# Patient Record
Sex: FEMALE | Race: WHITE | HISPANIC OR LATINO
[De-identification: names, ages, dates, MRNs, and addresses within clinical notes are randomized per-mention and may not be internally consistent; named-entity substitution may affect disease eponyms.]

---

## 2018-12-01 PROBLEM — Z00.00 ENCOUNTER FOR PREVENTIVE HEALTH EXAMINATION: Status: ACTIVE | Noted: 2018-12-01

## 2018-12-12 ENCOUNTER — RECORD ABSTRACTING (OUTPATIENT)
Age: 53
End: 2018-12-12

## 2018-12-12 DIAGNOSIS — Z87.42 PERSONAL HISTORY OF OTHER DISEASES OF THE FEMALE GENITAL TRACT: ICD-10-CM

## 2018-12-12 DIAGNOSIS — F32.9 MAJOR DEPRESSIVE DISORDER, SINGLE EPISODE, UNSPECIFIED: ICD-10-CM

## 2018-12-12 DIAGNOSIS — Z83.3 FAMILY HISTORY OF DIABETES MELLITUS: ICD-10-CM

## 2018-12-12 DIAGNOSIS — Z78.9 OTHER SPECIFIED HEALTH STATUS: ICD-10-CM

## 2018-12-12 DIAGNOSIS — E66.9 OBESITY, UNSPECIFIED: ICD-10-CM

## 2018-12-12 DIAGNOSIS — G35 MULTIPLE SCLEROSIS: ICD-10-CM

## 2018-12-12 DIAGNOSIS — B37.2 CANDIDIASIS OF SKIN AND NAIL: ICD-10-CM

## 2018-12-12 DIAGNOSIS — F41.9 ANXIETY DISORDER, UNSPECIFIED: ICD-10-CM

## 2018-12-12 DIAGNOSIS — Z82.49 FAMILY HISTORY OF ISCHEMIC HEART DISEASE AND OTHER DISEASES OF THE CIRCULATORY SYSTEM: ICD-10-CM

## 2018-12-12 LAB — CYTOLOGY CVX/VAG DOC THIN PREP: NORMAL

## 2018-12-24 PROBLEM — B37.2 CANDIDAL INTERTRIGO: Status: ACTIVE | Noted: 2018-12-12

## 2018-12-31 ENCOUNTER — APPOINTMENT (OUTPATIENT)
Dept: OBGYN | Facility: CLINIC | Age: 53
End: 2018-12-31

## 2019-01-31 ENCOUNTER — APPOINTMENT (OUTPATIENT)
Dept: OBGYN | Facility: CLINIC | Age: 54
End: 2019-01-31
Payer: COMMERCIAL

## 2019-01-31 VITALS
HEIGHT: 69 IN | SYSTOLIC BLOOD PRESSURE: 120 MMHG | DIASTOLIC BLOOD PRESSURE: 74 MMHG | WEIGHT: 217 LBS | BODY MASS INDEX: 32.14 KG/M2

## 2019-01-31 DIAGNOSIS — Z87.898 PERSONAL HISTORY OF OTHER SPECIFIED CONDITIONS: ICD-10-CM

## 2019-01-31 DIAGNOSIS — L29.2 PRURITUS VULVAE: ICD-10-CM

## 2019-01-31 DIAGNOSIS — Z86.19 PERSONAL HISTORY OF OTHER INFECTIOUS AND PARASITIC DISEASES: ICD-10-CM

## 2019-01-31 LAB
BILIRUB UR QL STRIP: NORMAL
COLLECTION METHOD: NORMAL
DATE COLLECTED: NORMAL
GLUCOSE UR-MCNC: NORMAL
HCG UR QL: 0.2 EU/DL
HCG UR QL: NEGATIVE
HEMOCCULT SP1 STL QL: NEGATIVE
HGB UR QL STRIP.AUTO: NORMAL
KETONES UR-MCNC: NORMAL
LEUKOCYTE ESTERASE UR QL STRIP: NORMAL
NITRITE UR QL STRIP: NORMAL
PH UR STRIP: 6
PROT UR STRIP-MCNC: NORMAL
QUALITY CONTROL: YES
QUALITY CONTROL: YES
SP GR UR STRIP: 1.02

## 2019-01-31 PROCEDURE — 81025 URINE PREGNANCY TEST: CPT

## 2019-01-31 PROCEDURE — 82270 OCCULT BLOOD FECES: CPT

## 2019-01-31 PROCEDURE — 81003 URINALYSIS AUTO W/O SCOPE: CPT | Mod: QW

## 2019-01-31 PROCEDURE — 99396 PREV VISIT EST AGE 40-64: CPT

## 2019-01-31 NOTE — PHYSICAL EXAM
[Awake] : awake [Alert] : alert [Soft] : soft [Oriented x3] : oriented to person, place, and time [Vulvar Atrophy] : vulvar atrophy [Vulvitis] : vulvitis [Labia Majora] : labia major [Labia Minora] : labia minora [Normal] : clitoris [Atrophy] : atrophy [No Bleeding] : there was no active vaginal bleeding [Uterine Adnexae] : were not tender and not enlarged [No Tenderness] : no rectal tenderness [Nl Sphincter Tone] : normal sphincter tone [Acute Distress] : no acute distress [Mass] : no breast mass [Nipple Discharge] : no nipple discharge [Axillary LAD] : no axillary lymphadenopathy [Tender] : non tender [Discharge] : no discharge [Tenderness] : nontender [Enlarged ___ wks] : not enlarged [Mass ___ cm] : no uterine mass was palpated [Occult Blood] : occult blood test from digital rectal exam was negative

## 2019-01-31 NOTE — REVIEW OF SYSTEMS
[Sight Problems] : sight problems [Excessive Sweating] : excessive sweating [Nl] : Hematologic/Lymphatic [FreeTextEntry2] : and excessive thirst

## 2019-01-31 NOTE — HISTORY OF PRESENT ILLNESS
[Last Bone Density ___] : Last bone density studies [unfilled] [Last Pap Smear ___] : last Papanicolaou cytology done [unfilled] [Last HPV Screen ___] : last human papilloma virus screening done [unfilled] [Last Mammogram ___] : last mammogram done [unfilled] [Hot Flashes] : hot flashes [Definite:  ___ (Date)] : the last menstrual period was [unfilled] [Menarche Age: ____] : age at menarche was [unfilled] [___ Month(s) Ago] : [unfilled] month(s) ago [Good] : being in good health [Colonoscopy Within 10 Years] : a colonoscopy within the past ten years [Burning] : burning [Itching] : itching [Stinging] : stinging [Rt Vulva] : right vulva [Lt Vulva] : left vulva [Menarche ____ yo] : menarche at [unfilled] yo [Healthy Diet] : not having a healthy diet [Regular Exercise] : not exercising regularly [FreeTextEntry8] : vaginal dryness [Sexually Active] : is not sexually active

## 2019-02-01 LAB — HPV HIGH+LOW RISK DNA PNL CVX: NOT DETECTED

## 2019-02-05 LAB — CYTOLOGY CVX/VAG DOC THIN PREP: NORMAL

## 2019-02-19 ENCOUNTER — ASOB RESULT (OUTPATIENT)
Age: 54
End: 2019-02-19

## 2019-02-19 ENCOUNTER — APPOINTMENT (OUTPATIENT)
Dept: OBGYN | Facility: CLINIC | Age: 54
End: 2019-02-19
Payer: COMMERCIAL

## 2019-02-19 PROCEDURE — 76830 TRANSVAGINAL US NON-OB: CPT

## 2019-05-22 ENCOUNTER — ASOB RESULT (OUTPATIENT)
Age: 54
End: 2019-05-22

## 2019-05-22 ENCOUNTER — APPOINTMENT (OUTPATIENT)
Dept: OBGYN | Facility: CLINIC | Age: 54
End: 2019-05-22
Payer: COMMERCIAL

## 2019-05-22 VITALS
DIASTOLIC BLOOD PRESSURE: 80 MMHG | BODY MASS INDEX: 31.1 KG/M2 | HEIGHT: 69 IN | WEIGHT: 210 LBS | SYSTOLIC BLOOD PRESSURE: 120 MMHG

## 2019-05-22 DIAGNOSIS — N83.209 UNSPECIFIED OVARIAN CYST, UNSPECIFIED SIDE: ICD-10-CM

## 2019-05-22 LAB
BILIRUB UR QL STRIP: NORMAL
GLUCOSE UR-MCNC: NORMAL
HCG UR QL: 0.2 EU/DL
HGB UR QL STRIP.AUTO: ABNORMAL
KETONES UR-MCNC: ABNORMAL
LEUKOCYTE ESTERASE UR QL STRIP: NORMAL
NITRITE UR QL STRIP: NORMAL
PH UR STRIP: 6
PROT UR STRIP-MCNC: NORMAL
SP GR UR STRIP: 1.02

## 2019-05-22 PROCEDURE — 76830 TRANSVAGINAL US NON-OB: CPT

## 2019-05-22 PROCEDURE — 81003 URINALYSIS AUTO W/O SCOPE: CPT | Mod: QW

## 2019-05-22 PROCEDURE — 99213 OFFICE O/P EST LOW 20 MIN: CPT | Mod: 25

## 2019-05-22 NOTE — HISTORY OF PRESENT ILLNESS
[___ Month(s) Ago] : [unfilled] month(s) ago [Good] : being in good health [Healthy Diet] : a healthy diet [Regular Exercise] : regular exercise [Last Pap Smear ___] : last Papanicolaou cytology done [unfilled] [Annual Vaginal Sonography ___] : annual vaginal sonograph [unfilled] [Last Mammogram ___] : last mammogram done [unfilled] [Postmenopausal] : is postmenopausal [HPV Vaccine NA Due to Age] : HPV vaccine not available to patient due to age [Menarche Age: ____] : age at menarche was [unfilled] [Weight Concerns] : no concerns with her weight [de-identified] : BU: 2/28/2019 BR1  [Sexually Active] : is not sexually active

## 2019-05-22 NOTE — PHYSICAL EXAM
[Awake] : awake [Alert] : alert [Oriented x3] : oriented to person, place, and time [Acute Distress] : no acute distress

## 2019-05-22 NOTE — DISCUSSION/SUMMARY
[FreeTextEntry1] : 54 year old  0, whose last menstrual period was 2018, is here for consultation\par \par Ms. CHE has a history of a right oophorectomy and has been followed for a stable left ovarian cyst.  She has had intermittent pain and today is asymptomatic.  Today the ultrasound shows a 2.73 x 1.91 x 1.97 cm left ovary with resolution of the cyst.  There is no free fluid.  The mechanism of cyst formation and as well as the definition, expectations of menopause were explained.\par \par All of her concerns were addressed, questions answered and reassurance was given.

## 2020-07-21 ENCOUNTER — APPOINTMENT (OUTPATIENT)
Dept: OBGYN | Facility: CLINIC | Age: 55
End: 2020-07-21
Payer: COMMERCIAL

## 2020-07-21 VITALS
TEMPERATURE: 98 F | SYSTOLIC BLOOD PRESSURE: 138 MMHG | HEIGHT: 68 IN | WEIGHT: 215 LBS | BODY MASS INDEX: 32.58 KG/M2 | DIASTOLIC BLOOD PRESSURE: 82 MMHG

## 2020-07-21 DIAGNOSIS — Z12.11 ENCOUNTER FOR SCREENING FOR MALIGNANT NEOPLASM OF COLON: ICD-10-CM

## 2020-07-21 DIAGNOSIS — R14.0 ABDOMINAL DISTENSION (GASEOUS): ICD-10-CM

## 2020-07-21 DIAGNOSIS — Z01.419 ENCOUNTER FOR GYNECOLOGICAL EXAMINATION (GENERAL) (ROUTINE) W/OUT ABNORMAL FINDINGS: ICD-10-CM

## 2020-07-21 DIAGNOSIS — R92.2 INCONCLUSIVE MAMMOGRAM: ICD-10-CM

## 2020-07-21 DIAGNOSIS — Z12.39 ENCOUNTER FOR OTHER SCREENING FOR MALIGNANT NEOPLASM OF BREAST: ICD-10-CM

## 2020-07-21 DIAGNOSIS — Z72.3 LACK OF PHYSICAL EXERCISE: ICD-10-CM

## 2020-07-21 DIAGNOSIS — R68.81 EARLY SATIETY: ICD-10-CM

## 2020-07-21 DIAGNOSIS — Z12.4 ENCOUNTER FOR SCREENING FOR MALIGNANT NEOPLASM OF CERVIX: ICD-10-CM

## 2020-07-21 DIAGNOSIS — Z83.42 FAMILY HISTORY OF FAMILIAL HYPERCHOLESTEROLEMIA: ICD-10-CM

## 2020-07-21 LAB
DATE COLLECTED: NORMAL
HEMOCCULT SP1 STL QL: NEGATIVE
QUALITY CONTROL: YES

## 2020-07-21 PROCEDURE — 82270 OCCULT BLOOD FECES: CPT

## 2020-07-21 PROCEDURE — 99396 PREV VISIT EST AGE 40-64: CPT

## 2020-07-21 RX ORDER — CLOBETASOL PROPIONATE 0.5 MG/G
0.05 CREAM TOPICAL TWICE DAILY
Qty: 20 | Refills: 5 | Status: DISCONTINUED | COMMUNITY
Start: 2019-01-31 | End: 2020-07-21

## 2020-07-21 NOTE — COUNSELING
[Exercise] : exercise [Nutrition] : nutrition [Breast Self Exam] : breast self exam [Weight Management] : weight management [Vitamins/Supplements] : vitamins/supplements

## 2020-07-22 LAB — HPV HIGH+LOW RISK DNA PNL CVX: NOT DETECTED

## 2020-08-17 NOTE — HISTORY OF PRESENT ILLNESS
[___ Month(s) Ago] : [unfilled] month(s) ago [Good] : being in good health [Last Bone Density ___] : Last bone density studies [unfilled] [Last Pap Smear ___] : last Papanicolaou cytology done [unfilled] [Last HPV Screen ___] : last human papilloma virus screening done [unfilled] [Colonoscopy Within 10 Years] : a colonoscopy within the past ten years [Last Mammogram ___] : last mammogram done [unfilled] [Burning] : burning [Itching] : itching [Stinging] : stinging [Rt Vulva] : right vulva [Lt Vulva] : left vulva [Menarche ____ yo] : menarche at [unfilled] yo [Hot Flashes] : hot flashes [Definite:  ___ (Date)] : the last menstrual period was [unfilled] [Menarche Age: ____] : age at menarche was [unfilled] [Healthy Diet] : not having a healthy diet [Regular Exercise] : not exercising regularly [FreeTextEntry8] : vaginal dryness [Sexually Active] : is not sexually active

## 2020-08-17 NOTE — PHYSICAL EXAM
[Awake] : awake [Alert] : alert [Soft] : soft [Vulvar Atrophy] : vulvar atrophy [Oriented x3] : oriented to person, place, and time [Vulvitis] : vulvitis [Labia Majora] : labia major [Labia Minora] : labia minora [Normal] : clitoris [Atrophy] : atrophy [No Bleeding] : there was no active vaginal bleeding [No Tenderness] : no rectal tenderness [Uterine Adnexae] : were not tender and not enlarged [Nl Sphincter Tone] : normal sphincter tone [Acute Distress] : no acute distress [Mass] : no breast mass [Nipple Discharge] : no nipple discharge [Axillary LAD] : no axillary lymphadenopathy [Tender] : non tender [Discharge] : no discharge [Tenderness] : nontender [Enlarged ___ wks] : not enlarged [Mass ___ cm] : no uterine mass was palpated [Occult Blood] : occult blood test from digital rectal exam was negative

## 2020-08-17 NOTE — DISCUSSION/SUMMARY
[FreeTextEntry1] : 55 year old postmenopausal  0 para 2, whose last menstrual period was 2018, is here for an annual exam.  Her Pap smear from 2019 was negative and no oncogenic human papilloma virus was detected. \par \par Ms. CHE had breast imaging on 2019 which showed a stable parenchymal pattern.  There were "no spiculated masses or areas of architectural distortion...suspicious clustered microcalcifications."  The ultrasound shows "no sonographic evidence of malignancy in either breast."  \par \par On inspection of the breasts, there were no skin color changes, dimpling or retraction seen.  No masses or axillary lymphadenopathy were appreciated on palpation.  She was advised of the current recommendations to reduce breast cancer including but not limited to engaging in regular exercise, consuming a low fat diet, limiting alcohol intake, maintaining ideal body weight, continuing with yearly imaging and performing monthly self breast examination.  A referral for mammogram with ultrasound was provided.    \par \par The patient has a history of vulvar lichenification with cuts in the labial fold on the left side which has completely resolved.\par \par BEATRICE had a right oophorectomy and was being followed for a stable left ovarian cyst and intermittent pain.  Her pelvic ultrasound from  demonstrated resolution of the left ovarian cyst.  She has early satiety and abdominal bloating but denies nausea, vomiting or changes in bowel, bladder habits.  The patient was sent for a pelvic sonogram.  \par \par The importance of weight bearing exercise and adequate daily calcium with vitamin D3 to slow the progression of bone loss was underscored. \par \par Weight loss via health diet and exercise was suggested. \par \par All of her concerns were addressed, questions answered and reassurance was given.

## 2020-12-16 PROBLEM — Z87.42 HISTORY OF VAGINITIS: Status: RESOLVED | Noted: 2018-12-12 | Resolved: 2020-12-16

## 2020-12-23 PROBLEM — Z01.419 ENCOUNTER FOR ANNUAL ROUTINE GYNECOLOGICAL EXAMINATION: Status: RESOLVED | Noted: 2019-01-31 | Resolved: 2020-12-23

## 2021-10-12 ENCOUNTER — APPOINTMENT (OUTPATIENT)
Dept: OBGYN | Facility: CLINIC | Age: 56
End: 2021-10-12

## 2021-11-05 ENCOUNTER — APPOINTMENT (OUTPATIENT)
Dept: ORTHOPEDIC SURGERY | Facility: CLINIC | Age: 56
End: 2021-11-05

## 2023-08-11 DIAGNOSIS — Z78.9 OTHER SPECIFIED HEALTH STATUS: ICD-10-CM

## 2023-08-11 DIAGNOSIS — I77.9 DISORDER OF ARTERIES AND ARTERIOLES, UNSPECIFIED: ICD-10-CM

## 2023-08-11 DIAGNOSIS — I65.29 OCCLUSION AND STENOSIS OF UNSPECIFIED CAROTID ARTERY: ICD-10-CM

## 2023-08-11 DIAGNOSIS — Z82.49 FAMILY HISTORY OF ISCHEMIC HEART DISEASE AND OTHER DISEASES OF THE CIRCULATORY SYSTEM: ICD-10-CM

## 2023-08-11 DIAGNOSIS — R94.31 ABNORMAL ELECTROCARDIOGRAM [ECG] [EKG]: ICD-10-CM

## 2023-08-11 DIAGNOSIS — Z92.89 PERSONAL HISTORY OF OTHER MEDICAL TREATMENT: ICD-10-CM

## 2023-08-14 ENCOUNTER — APPOINTMENT (OUTPATIENT)
Dept: ORTHOPEDIC SURGERY | Facility: CLINIC | Age: 58
End: 2023-08-14
Payer: MEDICARE

## 2023-08-14 VITALS — WEIGHT: 215 LBS | HEIGHT: 68 IN | BODY MASS INDEX: 32.58 KG/M2

## 2023-08-14 DIAGNOSIS — M65.30 TRIGGER FINGER, UNSPECIFIED FINGER: ICD-10-CM

## 2023-08-14 DIAGNOSIS — M79.646 PAIN IN UNSPECIFIED FINGER(S): ICD-10-CM

## 2023-08-14 PROCEDURE — 73140 X-RAY EXAM OF FINGER(S): CPT | Mod: RT

## 2023-08-14 PROCEDURE — 99203 OFFICE O/P NEW LOW 30 MIN: CPT

## 2023-08-14 PROCEDURE — 99213 OFFICE O/P EST LOW 20 MIN: CPT

## 2023-08-14 NOTE — IMAGING
[de-identified] : Right hand with no swelling or erythema. Able to make fist, oppose thumb to small finger with good thenar bulk. No intrinsic atrophy. Tender along distal palmar crease, most notable at thumb. No overt locking with catching palpable. Sensation intact throughout with <2sec cap refill. MP hyperextension  Right thumb radiographs with no fracture or dislocation. Thumb CMC arthrosis.

## 2023-08-14 NOTE — ASSESSMENT
[FreeTextEntry1] : Right thumb trigger - Discussed with patient the pathoanatomy of trigger and options going forward. Risks/benefits discussed as well as natural progression that injection will provide some relief but symptoms may recur. Discussed that pain may worsen in coming days but will subside. Discussed that we can repeat an injection or that operative intervention may be indicated down the line.  F/u prn (for CSI)

## 2023-08-14 NOTE — HISTORY OF PRESENT ILLNESS
[de-identified] : Age: 58F PMHx: CAD, Anxiety, MS Hand Dominance: RHD Chief Complaint: Right trigger thumb onset 2020. Patient reports clicking, sticking and locking. Reports pain when the thumb gets locked. Denies numbness/tingling.  Trauma: NKI Outside Imaging/Treatment: X-rays 3 years ago OTC Medications: none OT/PT: none  Bracing: none  Pain worse with: when it clicks Pain better with: rest

## 2023-11-08 RX ORDER — EZETIMIBE 10 MG/1
10 TABLET ORAL
Qty: 90 | Refills: 3 | Status: ACTIVE | COMMUNITY
Start: 1900-01-01 | End: 1900-01-01

## 2023-12-19 ENCOUNTER — NON-APPOINTMENT (OUTPATIENT)
Age: 58
End: 2023-12-19

## 2023-12-19 ENCOUNTER — APPOINTMENT (OUTPATIENT)
Dept: CARDIOLOGY | Facility: CLINIC | Age: 58
End: 2023-12-19
Payer: MEDICARE

## 2023-12-19 VITALS
DIASTOLIC BLOOD PRESSURE: 62 MMHG | WEIGHT: 220 LBS | HEIGHT: 68 IN | OXYGEN SATURATION: 99 % | HEART RATE: 96 BPM | BODY MASS INDEX: 33.34 KG/M2 | SYSTOLIC BLOOD PRESSURE: 90 MMHG

## 2023-12-19 DIAGNOSIS — R94.31 ABNORMAL ELECTROCARDIOGRAM [ECG] [EKG]: ICD-10-CM

## 2023-12-19 DIAGNOSIS — I95.9 HYPOTENSION, UNSPECIFIED: ICD-10-CM

## 2023-12-19 DIAGNOSIS — I25.10 ATHEROSCLEROTIC HEART DISEASE OF NATIVE CORONARY ARTERY W/OUT ANGINA PECTORIS: ICD-10-CM

## 2023-12-19 PROCEDURE — 99204 OFFICE O/P NEW MOD 45 MIN: CPT

## 2023-12-19 PROCEDURE — 99214 OFFICE O/P EST MOD 30 MIN: CPT

## 2023-12-19 PROCEDURE — 93000 ELECTROCARDIOGRAM COMPLETE: CPT

## 2023-12-19 RX ORDER — METOPROLOL TARTRATE 50 MG/1
50 TABLET, FILM COATED ORAL
Refills: 0 | Status: DISCONTINUED | COMMUNITY
End: 2023-12-19

## 2023-12-19 RX ORDER — DEXTROAMPHETAMINE SACCHARATE, AMPHETAMINE ASPARTATE, DEXTROAMPHETAMINE SULFATE, AND AMPHETAMINE SULFATE 7.5; 7.5; 7.5; 7.5 MG/1; MG/1; MG/1; MG/1
30 TABLET ORAL TWICE DAILY
Refills: 0 | Status: DISCONTINUED | COMMUNITY
End: 2023-12-19

## 2023-12-19 RX ORDER — MULTIVIT-MIN/FOLIC/VIT K/LYCOP 400-300MCG
25 MCG TABLET ORAL
Refills: 0 | Status: DISCONTINUED | COMMUNITY
End: 2023-12-19

## 2023-12-19 NOTE — HISTORY OF PRESENT ILLNESS
[FreeTextEntry1] : The patient is a 58-year-old white female with past medical history remarkable for coronary artery disease, hypertension, hypercholesterolemia, carotid artery disease, and obesity. The patient does not exercise.  She is chest pain and dyspnea free.  She has gained 5 pounds since her last visit.

## 2023-12-19 NOTE — DISCUSSION/SUMMARY
[FreeTextEntry1] : Coronary artery disease Status post normal nuclear stress test 7/11/2023. Continue medical therapy  Hypertension Low normal today.  The patient was instructed to check and record her home blood pressure readings.  She will return to the office in 1 month with her blood pressure cuff and her readings.  If she remains hypotensive, her hydrochlorothiazide will be decreased or discontinued  Hypercholesterolemia Recent laboratory results requested  Obesity 5 pound weight gain since the patient's last visit BMI 33. We rediscussed diet, exercise, and weight loss  RTO 1 month [EKG obtained to assist in diagnosis and management of assessed problem(s)] : EKG obtained to assist in diagnosis and management of assessed problem(s)

## 2023-12-19 NOTE — CARDIOLOGY SUMMARY
[de-identified] : Normal sinus rhythm, left axis deviation, left anterior fascicular block, poor R wave progression [de-identified] : - CAD: CARDIAC CATHETERIZATION: [facility] [place], 5/9/19, EF 65%, LAD 30%, PHARMACOLOGIC NUCLEAR STRESS TEST: 7/11/2023, normal, EF 60% - ECHOCARDIOGRAM:6/14/2023, EF 65%, trace mitral regurgitation, no tricuspid regurgitation - CAROTID US: 6/14/2023, nonobstructive disease - Hypertension - Hypercholesterolemia - Obesity: BMI 33 - Multiple sclerosis - Abnormal ECG

## 2024-01-23 ENCOUNTER — APPOINTMENT (OUTPATIENT)
Dept: CARDIOLOGY | Facility: CLINIC | Age: 59
End: 2024-01-23

## 2024-03-26 ENCOUNTER — NON-APPOINTMENT (OUTPATIENT)
Age: 59
End: 2024-03-26

## 2024-03-26 ENCOUNTER — APPOINTMENT (OUTPATIENT)
Dept: CARDIOLOGY | Facility: CLINIC | Age: 59
End: 2024-03-26
Payer: MEDICARE

## 2024-03-26 VITALS
HEART RATE: 88 BPM | DIASTOLIC BLOOD PRESSURE: 82 MMHG | WEIGHT: 218.5 LBS | OXYGEN SATURATION: 99 % | HEIGHT: 68 IN | BODY MASS INDEX: 33.12 KG/M2 | SYSTOLIC BLOOD PRESSURE: 124 MMHG

## 2024-03-26 PROCEDURE — 93000 ELECTROCARDIOGRAM COMPLETE: CPT

## 2024-03-26 PROCEDURE — 99214 OFFICE O/P EST MOD 30 MIN: CPT

## 2024-03-26 RX ORDER — MULTIVITAMIN
TABLET ORAL DAILY
Refills: 0 | Status: ACTIVE | COMMUNITY

## 2024-03-26 RX ORDER — VENLAFAXINE HYDROCHLORIDE 150 MG/1
150 CAPSULE, EXTENDED RELEASE ORAL DAILY
Refills: 0 | Status: ACTIVE | COMMUNITY

## 2024-03-26 RX ORDER — ASPIRIN ENTERIC COATED TABLETS 81 MG 81 MG/1
81 TABLET, DELAYED RELEASE ORAL DAILY
Refills: 0 | Status: ACTIVE | COMMUNITY

## 2024-03-26 RX ORDER — LISDEXAMFETAMINE DIMESYLATE 40 MG/1
40 CAPSULE ORAL DAILY
Refills: 0 | Status: ACTIVE | COMMUNITY

## 2024-03-26 RX ORDER — NATALIZUMAB 300 MG/15ML
300 INJECTION INTRAVENOUS
Refills: 0 | Status: DISCONTINUED | COMMUNITY
End: 2024-03-26

## 2024-03-26 RX ORDER — HYDROCHLOROTHIAZIDE 25 MG/1
25 TABLET ORAL DAILY
Qty: 30 | Refills: 0 | Status: DISCONTINUED | COMMUNITY
End: 2024-03-26

## 2024-03-26 RX ORDER — UBLITUXIMAB 150 MG/6ML
150 INJECTION, SOLUTION, CONCENTRATE INTRAVENOUS
Refills: 0 | Status: ACTIVE | COMMUNITY

## 2024-04-05 ENCOUNTER — RX RENEWAL (OUTPATIENT)
Age: 59
End: 2024-04-05

## 2024-04-05 RX ORDER — METOPROLOL SUCCINATE 25 MG/1
25 TABLET, EXTENDED RELEASE ORAL
Qty: 90 | Refills: 0 | Status: ACTIVE | COMMUNITY
Start: 1900-01-01 | End: 1900-01-01

## 2024-04-17 ENCOUNTER — RX RENEWAL (OUTPATIENT)
Age: 59
End: 2024-04-17

## 2024-04-17 RX ORDER — ATORVASTATIN CALCIUM 80 MG/1
80 TABLET, FILM COATED ORAL
Qty: 90 | Refills: 3 | Status: ACTIVE | COMMUNITY
Start: 2024-04-17 | End: 1900-01-01

## 2024-04-29 ENCOUNTER — APPOINTMENT (OUTPATIENT)
Dept: CARDIOLOGY | Facility: CLINIC | Age: 59
End: 2024-04-29
Payer: MEDICARE

## 2024-04-29 VITALS
SYSTOLIC BLOOD PRESSURE: 124 MMHG | HEIGHT: 68 IN | DIASTOLIC BLOOD PRESSURE: 80 MMHG | HEART RATE: 91 BPM | BODY MASS INDEX: 33.34 KG/M2 | OXYGEN SATURATION: 96 % | WEIGHT: 220 LBS

## 2024-04-29 DIAGNOSIS — I25.10 ATHEROSCLEROTIC HEART DISEASE OF NATIVE CORONARY ARTERY W/OUT ANGINA PECTORIS: ICD-10-CM

## 2024-04-29 DIAGNOSIS — E78.5 HYPERLIPIDEMIA, UNSPECIFIED: ICD-10-CM

## 2024-04-29 DIAGNOSIS — I10 ESSENTIAL (PRIMARY) HYPERTENSION: ICD-10-CM

## 2024-04-29 PROCEDURE — 99213 OFFICE O/P EST LOW 20 MIN: CPT

## 2024-04-29 RX ORDER — ERGOCALCIFEROL 1.25 MG/1
1.25 MG CAPSULE, LIQUID FILLED ORAL
Refills: 0 | Status: ACTIVE | COMMUNITY

## 2025-01-29 ENCOUNTER — NON-APPOINTMENT (OUTPATIENT)
Age: 60
End: 2025-01-29

## 2025-01-29 ENCOUNTER — APPOINTMENT (OUTPATIENT)
Dept: CARDIOLOGY | Facility: CLINIC | Age: 60
End: 2025-01-29
Payer: MEDICARE

## 2025-01-29 VITALS
HEIGHT: 68 IN | BODY MASS INDEX: 33.34 KG/M2 | WEIGHT: 220 LBS | SYSTOLIC BLOOD PRESSURE: 126 MMHG | OXYGEN SATURATION: 96 % | HEART RATE: 72 BPM | DIASTOLIC BLOOD PRESSURE: 84 MMHG

## 2025-01-29 DIAGNOSIS — I25.10 ATHEROSCLEROTIC HEART DISEASE OF NATIVE CORONARY ARTERY W/OUT ANGINA PECTORIS: ICD-10-CM

## 2025-01-29 DIAGNOSIS — I10 ESSENTIAL (PRIMARY) HYPERTENSION: ICD-10-CM

## 2025-01-29 DIAGNOSIS — E78.5 HYPERLIPIDEMIA, UNSPECIFIED: ICD-10-CM

## 2025-01-29 DIAGNOSIS — E66.9 OBESITY, UNSPECIFIED: ICD-10-CM

## 2025-01-29 DIAGNOSIS — R94.31 ABNORMAL ELECTROCARDIOGRAM [ECG] [EKG]: ICD-10-CM

## 2025-01-29 PROCEDURE — 93000 ELECTROCARDIOGRAM COMPLETE: CPT

## 2025-01-29 PROCEDURE — 99214 OFFICE O/P EST MOD 30 MIN: CPT

## 2025-03-06 ENCOUNTER — APPOINTMENT (OUTPATIENT)
Dept: CARDIOLOGY | Facility: CLINIC | Age: 60
End: 2025-03-06
Payer: MEDICARE

## 2025-03-06 PROCEDURE — 93880 EXTRACRANIAL BILAT STUDY: CPT

## 2025-03-06 PROCEDURE — 93306 TTE W/DOPPLER COMPLETE: CPT

## 2025-04-19 ENCOUNTER — RX RENEWAL (OUTPATIENT)
Age: 60
End: 2025-04-19

## 2025-07-17 ENCOUNTER — RX RENEWAL (OUTPATIENT)
Age: 60
End: 2025-07-17

## 2025-07-28 ENCOUNTER — APPOINTMENT (OUTPATIENT)
Dept: CARDIOLOGY | Facility: CLINIC | Age: 60
End: 2025-07-28
Payer: MEDICARE

## 2025-07-28 VITALS
WEIGHT: 220 LBS | DIASTOLIC BLOOD PRESSURE: 74 MMHG | HEIGHT: 68 IN | HEART RATE: 96 BPM | BODY MASS INDEX: 33.34 KG/M2 | OXYGEN SATURATION: 97 % | SYSTOLIC BLOOD PRESSURE: 118 MMHG

## 2025-07-28 DIAGNOSIS — E66.9 OBESITY, UNSPECIFIED: ICD-10-CM

## 2025-07-28 DIAGNOSIS — E78.5 HYPERLIPIDEMIA, UNSPECIFIED: ICD-10-CM

## 2025-07-28 DIAGNOSIS — I25.10 ATHEROSCLEROTIC HEART DISEASE OF NATIVE CORONARY ARTERY W/OUT ANGINA PECTORIS: ICD-10-CM

## 2025-07-28 DIAGNOSIS — I10 ESSENTIAL (PRIMARY) HYPERTENSION: ICD-10-CM

## 2025-07-28 PROCEDURE — 99214 OFFICE O/P EST MOD 30 MIN: CPT

## 2025-07-28 PROCEDURE — 93000 ELECTROCARDIOGRAM COMPLETE: CPT

## 2025-08-25 ENCOUNTER — RX RENEWAL (OUTPATIENT)
Age: 60
End: 2025-08-25